# Patient Record
Sex: FEMALE | Race: WHITE | NOT HISPANIC OR LATINO | URBAN - METROPOLITAN AREA
[De-identification: names, ages, dates, MRNs, and addresses within clinical notes are randomized per-mention and may not be internally consistent; named-entity substitution may affect disease eponyms.]

---

## 2022-04-13 ENCOUNTER — LAB OUTSIDE AN ENCOUNTER (OUTPATIENT)
Dept: URBAN - METROPOLITAN AREA CLINIC 19 | Facility: CLINIC | Age: 62
End: 2022-04-13

## 2022-04-13 ENCOUNTER — OUT OF OFFICE VISIT (OUTPATIENT)
Dept: URBAN - METROPOLITAN AREA MEDICAL CENTER 25 | Facility: MEDICAL CENTER | Age: 62
End: 2022-04-13
Payer: COMMERCIAL

## 2022-04-13 DIAGNOSIS — K74.60 ADVANCED CIRRHOSIS: ICD-10-CM

## 2022-04-13 DIAGNOSIS — K52.9 CHRONIC DIARRHEA: ICD-10-CM

## 2022-04-13 DIAGNOSIS — R93.3 ABN FINDINGS-GI TRACT: ICD-10-CM

## 2022-04-13 DIAGNOSIS — R74.8 ABNORMAL ALKALINE PHOSPHATASE TEST: ICD-10-CM

## 2022-04-13 PROCEDURE — 99233 SBSQ HOSP IP/OBS HIGH 50: CPT | Performed by: INTERNAL MEDICINE

## 2022-04-13 PROCEDURE — 99223 1ST HOSP IP/OBS HIGH 75: CPT | Performed by: INTERNAL MEDICINE

## 2022-04-13 PROCEDURE — G8427 DOCREV CUR MEDS BY ELIG CLIN: HCPCS | Performed by: INTERNAL MEDICINE

## 2022-04-16 ENCOUNTER — OUT OF OFFICE VISIT (OUTPATIENT)
Dept: URBAN - METROPOLITAN AREA MEDICAL CENTER 25 | Facility: MEDICAL CENTER | Age: 62
End: 2022-04-16
Payer: COMMERCIAL

## 2022-04-16 ENCOUNTER — LAB OUTSIDE AN ENCOUNTER (OUTPATIENT)
Dept: URBAN - METROPOLITAN AREA CLINIC 19 | Facility: CLINIC | Age: 62
End: 2022-04-16

## 2022-04-16 DIAGNOSIS — K83.1 AMPULLA OF VATER OBSTRUCTION SYNDROME: ICD-10-CM

## 2022-04-16 DIAGNOSIS — R74.8 ABNORMAL ALKALINE PHOSPHATASE TEST: ICD-10-CM

## 2022-04-16 DIAGNOSIS — K83.8 ACQUIRED DILATION OF BILE DUCT: ICD-10-CM

## 2022-04-16 DIAGNOSIS — R93.2 ABN FIND-BILIARY TRACT: ICD-10-CM

## 2022-04-16 PROCEDURE — 43274 ERCP DUCT STENT PLACEMENT: CPT | Performed by: INTERNAL MEDICINE

## 2022-05-02 ENCOUNTER — TELEPHONE ENCOUNTER (OUTPATIENT)
Dept: URBAN - METROPOLITAN AREA CLINIC 19 | Facility: CLINIC | Age: 62
End: 2022-05-02

## 2022-05-20 ENCOUNTER — OFFICE VISIT (OUTPATIENT)
Dept: URBAN - METROPOLITAN AREA CLINIC 128 | Facility: CLINIC | Age: 62
End: 2022-05-20

## 2022-06-04 ENCOUNTER — OUT OF OFFICE VISIT (OUTPATIENT)
Dept: URBAN - METROPOLITAN AREA MEDICAL CENTER 25 | Facility: MEDICAL CENTER | Age: 62
End: 2022-06-04
Payer: COMMERCIAL

## 2022-06-04 DIAGNOSIS — K70.31 ALCOHOLIC CIRRHOSIS OF LIVER WITH ASCITES: ICD-10-CM

## 2022-06-04 DIAGNOSIS — K86.1 CHRONIC PANCREATITIS: ICD-10-CM

## 2022-06-04 DIAGNOSIS — E80.6 HYPERBILIRUBINEMIA: ICD-10-CM

## 2022-06-04 PROCEDURE — G8427 DOCREV CUR MEDS BY ELIG CLIN: HCPCS | Performed by: STUDENT IN AN ORGANIZED HEALTH CARE EDUCATION/TRAINING PROGRAM

## 2022-06-04 PROCEDURE — 99222 1ST HOSP IP/OBS MODERATE 55: CPT | Performed by: STUDENT IN AN ORGANIZED HEALTH CARE EDUCATION/TRAINING PROGRAM

## 2022-06-04 PROCEDURE — 99232 SBSQ HOSP IP/OBS MODERATE 35: CPT | Performed by: STUDENT IN AN ORGANIZED HEALTH CARE EDUCATION/TRAINING PROGRAM

## 2022-06-06 ENCOUNTER — OUT OF OFFICE VISIT (OUTPATIENT)
Dept: URBAN - METROPOLITAN AREA MEDICAL CENTER 25 | Facility: MEDICAL CENTER | Age: 62
End: 2022-06-06
Payer: COMMERCIAL

## 2022-06-06 DIAGNOSIS — R19.7 ACUTE DIARRHEA: ICD-10-CM

## 2022-06-06 DIAGNOSIS — K86.1 ACUTE ON CHRONIC PANCREATITIS: ICD-10-CM

## 2022-06-06 DIAGNOSIS — K70.31 ALCOHOLIC CIRRHOSIS OF LIVER WITH ASCITES: ICD-10-CM

## 2022-06-06 PROCEDURE — 99233 SBSQ HOSP IP/OBS HIGH 50: CPT | Performed by: INTERNAL MEDICINE

## 2022-06-06 PROCEDURE — 99232 SBSQ HOSP IP/OBS MODERATE 35: CPT | Performed by: INTERNAL MEDICINE

## 2022-06-09 ENCOUNTER — OUT OF OFFICE VISIT (OUTPATIENT)
Dept: URBAN - METROPOLITAN AREA MEDICAL CENTER 25 | Facility: MEDICAL CENTER | Age: 62
End: 2022-06-09
Payer: COMMERCIAL

## 2022-06-09 DIAGNOSIS — K70.31 ALCOHOLIC CIRRHOSIS OF LIVER WITH ASCITES: ICD-10-CM

## 2022-06-09 DIAGNOSIS — K86.1 ACUTE ON CHRONIC PANCREATITIS: ICD-10-CM

## 2022-06-09 PROCEDURE — 99233 SBSQ HOSP IP/OBS HIGH 50: CPT | Performed by: INTERNAL MEDICINE

## 2022-06-13 ENCOUNTER — OUT OF OFFICE VISIT (OUTPATIENT)
Dept: URBAN - METROPOLITAN AREA MEDICAL CENTER 33 | Facility: MEDICAL CENTER | Age: 62
End: 2022-06-13
Payer: COMMERCIAL

## 2022-06-13 DIAGNOSIS — R19.7 ACUTE DIARRHEA: ICD-10-CM

## 2022-06-13 DIAGNOSIS — K83.1 AMPULLA OF VATER OBSTRUCTION SYNDROME: ICD-10-CM

## 2022-06-13 DIAGNOSIS — K86.1 ACUTE ON CHRONIC PANCREATITIS: ICD-10-CM

## 2022-06-13 DIAGNOSIS — K86.81 EXOCRINE PANCREATIC INSUFFICIENCY: ICD-10-CM

## 2022-06-13 DIAGNOSIS — K81.0 ACALCULOUS CHOLECYSTITIS: ICD-10-CM

## 2022-06-13 DIAGNOSIS — K74.69 CIRRHOSIS, CRYPTOGENIC: ICD-10-CM

## 2022-06-13 PROCEDURE — 99223 1ST HOSP IP/OBS HIGH 75: CPT | Performed by: INTERNAL MEDICINE

## 2022-06-13 PROCEDURE — G8427 DOCREV CUR MEDS BY ELIG CLIN: HCPCS | Performed by: INTERNAL MEDICINE

## 2022-06-13 PROCEDURE — 99232 SBSQ HOSP IP/OBS MODERATE 35: CPT | Performed by: INTERNAL MEDICINE

## 2022-06-16 ENCOUNTER — OUT OF OFFICE VISIT (OUTPATIENT)
Dept: URBAN - METROPOLITAN AREA MEDICAL CENTER 33 | Facility: MEDICAL CENTER | Age: 62
End: 2022-06-16
Payer: COMMERCIAL

## 2022-06-16 DIAGNOSIS — Z46.59 ENCOUNTER FOR FITTING AND ADJUSTMENT OF OTHER GASTROINTESTINAL APPLIANCE AND DEVICE: ICD-10-CM

## 2022-06-16 DIAGNOSIS — B37.89 CANDIDA RASH OF GROIN: ICD-10-CM

## 2022-06-16 DIAGNOSIS — K29.60 ADENOPAPILLOMATOSIS GASTRICA: ICD-10-CM

## 2022-06-16 DIAGNOSIS — B37.81 CANDIDA: ICD-10-CM

## 2022-06-16 DIAGNOSIS — K83.8 ACQUIRED DILATION OF BILE DUCT: ICD-10-CM

## 2022-06-16 PROCEDURE — 43239 EGD BIOPSY SINGLE/MULTIPLE: CPT | Performed by: INTERNAL MEDICINE

## 2022-06-16 PROCEDURE — 43264 ERCP REMOVE DUCT CALCULI: CPT | Performed by: INTERNAL MEDICINE

## 2022-06-16 PROCEDURE — 43275 ERCP REMOVE FORGN BODY DUCT: CPT | Performed by: INTERNAL MEDICINE

## 2022-06-16 PROCEDURE — 43262 ENDO CHOLANGIOPANCREATOGRAPH: CPT | Performed by: INTERNAL MEDICINE

## 2022-06-16 PROCEDURE — 74328 X-RAY BILE DUCT ENDOSCOPY: CPT | Performed by: INTERNAL MEDICINE

## 2022-07-06 PROBLEM — 235494005 CHRONIC PANCREATITIS: Status: ACTIVE | Noted: 2022-07-06

## 2022-07-06 PROBLEM — 14783006 HYPERBILIRUBINEMIA: Status: ACTIVE | Noted: 2022-07-06

## 2022-07-13 ENCOUNTER — TELEPHONE ENCOUNTER (OUTPATIENT)
Dept: URBAN - METROPOLITAN AREA CLINIC 128 | Facility: CLINIC | Age: 62
End: 2022-07-13

## 2022-07-15 ENCOUNTER — OUT OF OFFICE VISIT (OUTPATIENT)
Dept: URBAN - METROPOLITAN AREA MEDICAL CENTER 25 | Facility: MEDICAL CENTER | Age: 62
End: 2022-07-15
Payer: COMMERCIAL

## 2022-07-15 DIAGNOSIS — K65.2 SBP (SPONTANEOUS BACTERIAL PERITONITIS): ICD-10-CM

## 2022-07-15 DIAGNOSIS — K70.31 ALCOHOLIC CIRRHOSIS OF LIVER WITH ASCITES: ICD-10-CM

## 2022-07-15 PROCEDURE — 99222 1ST HOSP IP/OBS MODERATE 55: CPT | Performed by: INTERNAL MEDICINE

## 2022-07-15 PROCEDURE — G8427 DOCREV CUR MEDS BY ELIG CLIN: HCPCS | Performed by: INTERNAL MEDICINE

## 2022-07-16 ENCOUNTER — LAB OUTSIDE AN ENCOUNTER (OUTPATIENT)
Dept: URBAN - METROPOLITAN AREA CLINIC 19 | Facility: CLINIC | Age: 62
End: 2022-07-16

## 2022-07-16 ENCOUNTER — OUT OF OFFICE VISIT (OUTPATIENT)
Dept: URBAN - METROPOLITAN AREA MEDICAL CENTER 25 | Facility: MEDICAL CENTER | Age: 62
End: 2022-07-16
Payer: COMMERCIAL

## 2022-07-16 DIAGNOSIS — K65.2 SBP (SPONTANEOUS BACTERIAL PERITONITIS): ICD-10-CM

## 2022-07-16 DIAGNOSIS — K70.30 ALC (ALCOHOLIC LIVER CIRRHOSIS): ICD-10-CM

## 2022-07-16 PROCEDURE — 99232 SBSQ HOSP IP/OBS MODERATE 35: CPT | Performed by: INTERNAL MEDICINE

## 2022-07-18 ENCOUNTER — OUT OF OFFICE VISIT (OUTPATIENT)
Dept: URBAN - METROPOLITAN AREA MEDICAL CENTER 25 | Facility: MEDICAL CENTER | Age: 62
End: 2022-07-18
Payer: COMMERCIAL

## 2022-07-18 DIAGNOSIS — K70.31 ALCOHOLIC CIRRHOSIS OF LIVER WITH ASCITES: ICD-10-CM

## 2022-07-18 DIAGNOSIS — R93.3 ABN FINDINGS-GI TRACT: ICD-10-CM

## 2022-07-18 DIAGNOSIS — K65.2 SBP (SPONTANEOUS BACTERIAL PERITONITIS): ICD-10-CM

## 2022-07-18 DIAGNOSIS — R19.7 ACUTE DIARRHEA: ICD-10-CM

## 2022-07-18 PROCEDURE — 99232 SBSQ HOSP IP/OBS MODERATE 35: CPT | Performed by: STUDENT IN AN ORGANIZED HEALTH CARE EDUCATION/TRAINING PROGRAM

## 2022-07-20 ENCOUNTER — LAB OUTSIDE AN ENCOUNTER (OUTPATIENT)
Dept: URBAN - METROPOLITAN AREA CLINIC 19 | Facility: CLINIC | Age: 62
End: 2022-07-20

## 2022-07-21 ENCOUNTER — OUT OF OFFICE VISIT (OUTPATIENT)
Dept: URBAN - METROPOLITAN AREA MEDICAL CENTER 25 | Facility: MEDICAL CENTER | Age: 62
End: 2022-07-21
Payer: COMMERCIAL

## 2022-07-21 DIAGNOSIS — R93.3 ABN FINDINGS-GI TRACT: ICD-10-CM

## 2022-07-21 DIAGNOSIS — K65.2 SBP (SPONTANEOUS BACTERIAL PERITONITIS): ICD-10-CM

## 2022-07-21 DIAGNOSIS — R19.7 ACUTE DIARRHEA: ICD-10-CM

## 2022-07-21 DIAGNOSIS — K70.31 ALCOHOLIC CIRRHOSIS OF LIVER WITH ASCITES: ICD-10-CM

## 2022-07-21 DIAGNOSIS — A04.72 C. DIFFICILE: ICD-10-CM

## 2022-07-21 PROCEDURE — 99232 SBSQ HOSP IP/OBS MODERATE 35: CPT | Performed by: STUDENT IN AN ORGANIZED HEALTH CARE EDUCATION/TRAINING PROGRAM

## 2022-07-21 PROCEDURE — 99231 SBSQ HOSP IP/OBS SF/LOW 25: CPT | Performed by: STUDENT IN AN ORGANIZED HEALTH CARE EDUCATION/TRAINING PROGRAM

## 2022-08-15 ENCOUNTER — OFFICE VISIT (OUTPATIENT)
Dept: URBAN - METROPOLITAN AREA CLINIC 19 | Facility: CLINIC | Age: 62
End: 2022-08-15

## 2022-08-15 RX ORDER — HUMAN INSULIN 100 [IU]/ML
AS DIRECTED INJECTION, SOLUTION SUBCUTANEOUS
Status: ACTIVE | COMMUNITY

## 2022-08-15 RX ORDER — LISINOPRIL 5 MG/1
1 TABLET TABLET ORAL ONCE A DAY
Status: ACTIVE | COMMUNITY

## 2022-08-15 RX ORDER — GABAPENTIN 600 MG/1
1 TABLET TABLET, FILM COATED ORAL ONCE A DAY
Status: ACTIVE | COMMUNITY

## 2022-08-15 RX ORDER — NITROFURANTOIN MONOHYDRATE/MACROCRYSTALLINE 25; 75 MG/1; MG/1
1 CAPSULE AT BEDTIME WITH FOOD CAPSULE ORAL ONCE A DAY
Status: ACTIVE | COMMUNITY

## 2022-08-15 RX ORDER — HUMAN INSULIN 100 [IU]/ML
AS DIRECTED INJECTION, SUSPENSION SUBCUTANEOUS
Status: ACTIVE | COMMUNITY

## 2023-04-03 ENCOUNTER — TELEPHONE ENCOUNTER (OUTPATIENT)
Dept: URBAN - METROPOLITAN AREA CLINIC 19 | Facility: CLINIC | Age: 63
End: 2023-04-03

## 2023-04-12 ENCOUNTER — OFFICE VISIT (OUTPATIENT)
Dept: URBAN - METROPOLITAN AREA CLINIC 19 | Facility: CLINIC | Age: 63
End: 2023-04-12

## 2023-04-25 ENCOUNTER — OFFICE VISIT (OUTPATIENT)
Dept: URBAN - METROPOLITAN AREA CLINIC 19 | Facility: CLINIC | Age: 63
End: 2023-04-25

## 2023-04-25 RX ORDER — RIFAXIMIN 550 MG/1
TAKE 1 TABLET (550 MG) BY ORAL ROUTE 3 TIMES PER DAY FOR 14 DAYS TABLET ORAL
Qty: 42 | Refills: 0 | Status: ACTIVE | COMMUNITY
Start: 2017-12-01

## 2023-04-25 RX ORDER — CIPROFLOXACIN HYDROCHLORIDE 500 MG/1
TAKE 1 TABLET (500 MG) BY ORAL ROUTE 2 TIMES PER DAY FOR 14 DAYS TABLET, FILM COATED ORAL 2
Qty: 28 | Refills: 0 | Status: ACTIVE | COMMUNITY
Start: 2017-12-01

## 2023-04-25 RX ORDER — GABAPENTIN 600 MG/1
1 TABLET TABLET, FILM COATED ORAL ONCE A DAY
Status: ACTIVE | COMMUNITY

## 2023-04-25 RX ORDER — LISINOPRIL 5 MG/1
1 TABLET TABLET ORAL ONCE A DAY
Status: ACTIVE | COMMUNITY

## 2023-04-25 RX ORDER — HUMAN INSULIN 100 [IU]/ML
AS DIRECTED INJECTION, SOLUTION SUBCUTANEOUS
Status: ACTIVE | COMMUNITY

## 2023-04-25 RX ORDER — HUMAN INSULIN 100 [IU]/ML
AS DIRECTED INJECTION, SUSPENSION SUBCUTANEOUS
Status: ACTIVE | COMMUNITY

## 2023-04-25 RX ORDER — NITROFURANTOIN MONOHYDRATE/MACROCRYSTALLINE 25; 75 MG/1; MG/1
1 CAPSULE AT BEDTIME WITH FOOD CAPSULE ORAL ONCE A DAY
Status: ACTIVE | COMMUNITY

## 2023-05-30 ENCOUNTER — CLAIMS CREATED FROM THE CLAIM WINDOW (OUTPATIENT)
Dept: URBAN - METROPOLITAN AREA CLINIC 128 | Facility: CLINIC | Age: 63
End: 2023-05-30
Payer: COMMERCIAL

## 2023-05-30 VITALS
BODY MASS INDEX: 18.16 KG/M2 | SYSTOLIC BLOOD PRESSURE: 108 MMHG | DIASTOLIC BLOOD PRESSURE: 72 MMHG | WEIGHT: 113 LBS | HEIGHT: 66 IN | HEART RATE: 68 BPM | TEMPERATURE: 97.8 F

## 2023-05-30 DIAGNOSIS — R19.7 DIARRHEA: ICD-10-CM

## 2023-05-30 DIAGNOSIS — Z86.010 PERSONAL HISTORY OF COLONIC POLYPS: ICD-10-CM

## 2023-05-30 DIAGNOSIS — K74.60 ADVANCED CIRRHOSIS: ICD-10-CM

## 2023-05-30 DIAGNOSIS — K76.0 FATTY LIVER: ICD-10-CM

## 2023-05-30 DIAGNOSIS — K86.81 EXOCRINE PANCREATIC INSUFFICIENCY: ICD-10-CM

## 2023-05-30 DIAGNOSIS — K74.60 CIRRHOSIS OF LIVER WITHOUT ASCITES, UNSPECIFIED HEPATIC CIRRHOSIS TYPE: ICD-10-CM

## 2023-05-30 DIAGNOSIS — K80.20 CALCULUS OF GALLBLADDER WITHOUT CHOLECYSTITIS WITHOUT OBSTRUCTION: ICD-10-CM

## 2023-05-30 PROBLEM — 197321007: Status: ACTIVE | Noted: 2023-05-30

## 2023-05-30 PROBLEM — 428283002: Status: ACTIVE | Noted: 2023-05-30

## 2023-05-30 PROBLEM — 19943007: Status: ACTIVE | Noted: 2023-05-30

## 2023-05-30 PROBLEM — 70342003: Status: ACTIVE | Noted: 2023-05-30

## 2023-05-30 PROCEDURE — 99205 OFFICE O/P NEW HI 60 MIN: CPT | Performed by: PHYSICIAN ASSISTANT

## 2023-05-30 RX ORDER — PANCRELIPASE 36000; 180000; 114000 [USP'U]/1; [USP'U]/1; [USP'U]/1
TAKE 1 CAPSULE BEFORE EACH MEAL AND EACH SNACK CAPSULE, DELAYED RELEASE PELLETS ORAL
Qty: 150 | Refills: 2 | OUTPATIENT
Start: 2023-05-30 | End: 2023-08-28

## 2023-05-30 RX ORDER — HUMAN INSULIN 100 [IU]/ML
AS DIRECTED INJECTION, SOLUTION SUBCUTANEOUS
Status: ACTIVE | COMMUNITY

## 2023-05-30 RX ORDER — HUMAN INSULIN 100 [IU]/ML
AS DIRECTED INJECTION, SUSPENSION SUBCUTANEOUS
Status: ACTIVE | COMMUNITY

## 2023-05-30 RX ORDER — CHOLESTYRAMINE 4 G/9G
1 PACKET MIXED WITH WATER OR NON-CARBONATED DRINK POWDER, FOR SUSPENSION ORAL TWICE A DAY
Qty: 60 | Refills: 2 | OUTPATIENT
Start: 2023-05-30

## 2023-05-30 NOTE — HPI-TODAY'S VISIT:
The patient elicits having diarrhea. Patient has had how many bowel movements a day: 4-5 BMs a day She needs the patient assistance paperwork filled out for her creon to be refilled She has fecal incontinence due to a "botched rectal surgery" per patient Duration of symptoms: years It is relieved by: creon It is aggravated by: nothing Associated symptoms:  none Recent antibiotics: none Recent travel outside of [place]: none Recent sick contacts: none Current stress: yes Recent medication changes: none Recent dietary changes: none Recent weight changes: none 06/22 EGD revealed candida esophagitis, ERCP with stent removal, sweep of sludge, and sphincterotomy was performed. She never went to the general surgeon to see if she needed a cholecystectomy Last colonoscopy date: 2016 revealed a tubular adenoma and she was advised to repeat it in 3 years She had a hip fracture last year after falling going to the bathroom at night while on gabapentin  2022 MRCP/MRI revealed  Distention of the gallbladder and marked, bile duct         dilation, at least mildly progressed compared to 2014 and 2016. There         is abrupt transition to decompressed distal common bile duct at the         level of the pancreatic head and again seen are changes of chronic         pancreatitis, as well as persistent pancreatic ductal dilation. Within         limitations imposed by respiratory motion and lack of intravenous         contrast, no discrete pancreatic head mass is identified. There is         layering low-signal intensity within the common bile duct which could         represent sludge and/or small stones, versus artifact. ERCP could be         considered for further evaluation. The caliber change of the distal         common bile duct is favored to represent chronic stricturing in the         setting of multiple episodes of prior pancreatitis but is         indeterminate. Short-term follow-up may be considered, versus further         evaluation with postcontrast MRI of the pancreas to better evaluate the         region of the pancreatic head.          Cirrhotic morphology of the liver with a small amount of adjacent         ascites. Again, within limitations imposed by lack of intravenous         contrast, no definite focal hepatic lesions are seen on this exam.         Kidneys where seen are also unremarkable.          Suspected significant central canal stenosis at L2-L3, incompletely         evaluated but likely related to significant disc/osteophyte complex at         that level.  RUQ US 2022  revealed remarkably abnormal gallbladder with gallbladder wall thickening and         findings suspicious for intramural edema. Gallbladder sludge and         cholelithiasis is noted. The common bile duct is dilated measured at 8         mm and 16 mm. Sonographic Monahan's sign was reportedly not present.         Common bile duct obstruction is not excluded. Common bile duct stent         reported on the prior ultrasound and noted on recent prior CT, not         demonstrated on the current study ultrasound. Clinical correlation is         needed.          Mild hepatic steatosis. Portal vein patent with hepatopedal flow.          The pancreas was poorly visualized.          There is a small amount of ascites present.

## 2023-05-31 ENCOUNTER — TELEPHONE ENCOUNTER (OUTPATIENT)
Dept: URBAN - METROPOLITAN AREA CLINIC 128 | Facility: CLINIC | Age: 63
End: 2023-05-31

## 2023-06-05 ENCOUNTER — OFFICE VISIT (OUTPATIENT)
Dept: URBAN - METROPOLITAN AREA SURGERY CENTER 31 | Facility: SURGERY CENTER | Age: 63
End: 2023-06-05

## 2023-06-23 ENCOUNTER — TELEPHONE ENCOUNTER (OUTPATIENT)
Dept: URBAN - METROPOLITAN AREA CLINIC 19 | Facility: CLINIC | Age: 63
End: 2023-06-23

## 2023-06-29 ENCOUNTER — LAB OUTSIDE AN ENCOUNTER (OUTPATIENT)
Dept: URBAN - METROPOLITAN AREA CLINIC 128 | Facility: CLINIC | Age: 63
End: 2023-06-29

## 2023-07-04 LAB
A/G RATIO: 1.6
ABSOLUTE BASOPHILS: 85
ABSOLUTE EOSINOPHILS: 721
ABSOLUTE LYMPHOCYTES: 4378
ABSOLUTE MONOCYTES: 583
ABSOLUTE NEUTROPHILS: 4834
ACTIN (SMOOTH MUSCLE) ANTIBODY (IGG): <20
AFP, SERUM, TUMOR MARKER: 3
ALBUMIN: 4.3
ALKALINE PHOSPHATASE: 320
ALPHA-1-ANTITRYPSIN QN: 154
ALT (SGPT): 21
ANA SCREEN, IFA: POSITIVE
AST (SGOT): 22
BASOPHILS: 0.8
BILIRUBIN, TOTAL: 0.5
BUN/CREATININE RATIO: (no result)
BUN: 12
C-REACTIVE PROTEIN, QUANT: 1.8
CALCIUM: 9.5
CARBON DIOXIDE, TOTAL: 26
CERULOPLASMIN: 28
CHLORIDE: 90
CREATININE: 0.81
EGFR: 82
EOSINOPHILS: 6.8
FERRITIN, SERUM: 51
GLOBULIN, TOTAL: 2.7
GLUCOSE: 152
HBSAG SCREEN: (no result)
HEMATOCRIT: 38.5
HEMOGLOBIN: 13
HEP A AB, IGM: (no result)
HEP B CORE AB, IGM: (no result)
HEPATITIS C ANTIBODY: (no result)
IMMUNOGLOBULIN A, QN, SERUM: 364
INR: 1.1
INTERPRETATION: (no result)
INTERPRETATION: (no result)
IRON BIND.CAP.(TIBC): 312
IRON SATURATION: 23
IRON: 72
LYMPHOCYTES: 41.3
MCH: 31.8
MCHC: 33.8
MCV: 94.1
MITOCHONDRIAL (M2) ANTIBODY: 21.7
MONOCYTES: 5.5
MPV: 10.1
NEUTROPHILS: 45.6
PLATELET COUNT: 287
POTASSIUM: 4.7
PROTEIN, TOTAL: 7
PT: 11.2
RDW: 12.2
RED BLOOD CELL COUNT: 4.09
RHEUMATOID FACTOR: <14
SJOGREN'S ANTIBODY (SS-A): (no result)
SJOGREN'S ANTIBODY (SS-B): (no result)
SODIUM: 129
T-TRANSGLUTAMINASE (TTG) IGA: <1
TSH W/REFLEX TO FT4: 1.6
WHITE BLOOD CELL COUNT: 10.6

## 2023-07-08 LAB
CALPROTECTIN, FECAL: 36
CAMPYLOBACTER SPP. AG,EIA: (no result)
CLOSTRIDIUM DIFFICILE TOXINB,QL REAL TIME PCR: NOT DETECTED
GIARDIA AG, EIA, STOOL: (no result)
LEUKOCYTES: (no result)
OVA AND PARASITES, CONC AND PERM SMEAR: (no result)
PANCREATIC ELASTASE, FECAL: <15
SALMONELLA AND SHIGELLA, CULTURE: (no result)
SHIGA TOXINS, EIA W/RFL TO E.COLI O157 CULTURE: (no result)

## 2023-07-19 ENCOUNTER — CLAIMS CREATED FROM THE CLAIM WINDOW (OUTPATIENT)
Dept: URBAN - METROPOLITAN AREA CLINIC 128 | Facility: CLINIC | Age: 63
End: 2023-07-19
Payer: COMMERCIAL

## 2023-07-19 ENCOUNTER — TELEPHONE ENCOUNTER (OUTPATIENT)
Dept: URBAN - METROPOLITAN AREA CLINIC 128 | Facility: CLINIC | Age: 63
End: 2023-07-19

## 2023-07-19 ENCOUNTER — WEB ENCOUNTER (OUTPATIENT)
Dept: URBAN - METROPOLITAN AREA CLINIC 128 | Facility: CLINIC | Age: 63
End: 2023-07-19

## 2023-07-19 VITALS
HEART RATE: 80 BPM | TEMPERATURE: 98.3 F | SYSTOLIC BLOOD PRESSURE: 118 MMHG | DIASTOLIC BLOOD PRESSURE: 66 MMHG | BODY MASS INDEX: 18.71 KG/M2 | WEIGHT: 116.4 LBS | HEIGHT: 66 IN

## 2023-07-19 DIAGNOSIS — R19.7 DIARRHEA: ICD-10-CM

## 2023-07-19 DIAGNOSIS — K80.20 CALCULUS OF GALLBLADDER WITHOUT CHOLECYSTITIS WITHOUT OBSTRUCTION: ICD-10-CM

## 2023-07-19 DIAGNOSIS — K74.69 CIRRHOSIS, CRYPTOGENIC: ICD-10-CM

## 2023-07-19 DIAGNOSIS — K86.81 EXOCRINE PANCREATIC INSUFFICIENCY: ICD-10-CM

## 2023-07-19 DIAGNOSIS — Z86.010 PERSONAL HISTORY OF COLONIC POLYPS: ICD-10-CM

## 2023-07-19 PROCEDURE — 99214 OFFICE O/P EST MOD 30 MIN: CPT | Performed by: PHYSICIAN ASSISTANT

## 2023-07-19 RX ORDER — CHOLESTYRAMINE 4 G/9G
1 PACKET MIXED WITH WATER OR NON-CARBONATED DRINK POWDER, FOR SUSPENSION ORAL TWICE A DAY
Qty: 60 | Refills: 2 | OUTPATIENT

## 2023-07-19 RX ORDER — PANCRELIPASE 36000; 180000; 114000 [USP'U]/1; [USP'U]/1; [USP'U]/1
TAKE 1 CAPSULE BEFORE EACH MEAL AND EACH SNACK CAPSULE, DELAYED RELEASE PELLETS ORAL
Qty: 150 | Refills: 2 | Status: ACTIVE | COMMUNITY
Start: 2023-05-30 | End: 2023-08-28

## 2023-07-19 RX ORDER — CHOLESTYRAMINE 4 G/9G
1 PACKET MIXED WITH WATER OR NON-CARBONATED DRINK POWDER, FOR SUSPENSION ORAL TWICE A DAY
Qty: 60 | Refills: 2 | Status: ACTIVE | COMMUNITY
Start: 2023-05-30

## 2023-07-19 RX ORDER — PANCRELIPASE 36000; 180000; 114000 [USP'U]/1; [USP'U]/1; [USP'U]/1
TAKE 1 CAPSULE BEFORE EACH MEAL AND EACH SNACK CAPSULE, DELAYED RELEASE PELLETS ORAL
Qty: 150 | Refills: 2 | OUTPATIENT

## 2023-07-19 NOTE — PHYSICAL EXAM GASTROINTESTINAL
Abdomen , soft, nontender, nondistended , no guarding or rigidity , no masses palpable , normal bowel sounds, negative Monahan's sign, negative psoas and obturators signs, negative CVA tenderness bilaterally Liver and Spleen , no hepatosplenomegaly Rectal deferred

## 2023-07-19 NOTE — HPI-TODAY'S VISIT:
The patient is here for a follow up visit on her diarrhea. It has ceased. She is on creon now and is doing well and having 1 BM every other day Duration of symptoms: years It is relieved by: creon It is aggravated by: nothing Associated symptoms:  none Recent antibiotics: none Recent travel outside of [place]: none Recent sick contacts: none Current stress: yes Recent medication changes: none Recent dietary changes: none Recent weight changes: none 06/22 EGD revealed candida esophagitis, ERCP with stent removal, sweep of sludge, and sphincterotomy was performed. She never went to the general surgeon to see if she needed a cholecystectomy Last colonoscopy date: 2016 revealed a tubular adenoma and she was advised to repeat it in 3 years She had a hip fracture last year after falling going to the bathroom at night while on gabapentin  2022 MRCP/MRI revealed  Distention of the gallbladder and marked, bile duct         dilation, at least mildly progressed compared to 2014 and 2016. There         is abrupt transition to decompressed distal common bile duct at the         level of the pancreatic head and again seen are changes of chronic         pancreatitis, as well as persistent pancreatic ductal dilation. Within         limitations imposed by respiratory motion and lack of intravenous         contrast, no discrete pancreatic head mass is identified. There is         layering low-signal intensity within the common bile duct which could         represent sludge and/or small stones, versus artifact. ERCP could be         considered for further evaluation. The caliber change of the distal         common bile duct is favored to represent chronic stricturing in the         setting of multiple episodes of prior pancreatitis but is         indeterminate. Short-term follow-up may be considered, versus further         evaluation with postcontrast MRI of the pancreas to better evaluate the         region of the pancreatic head.          Cirrhotic morphology of the liver with a small amount of adjacent         ascites. Again, within limitations imposed by lack of intravenous         contrast, no definite focal hepatic lesions are seen on this exam.         Kidneys where seen are also unremarkable.          Suspected significant central canal stenosis at L2-L3, incompletely         evaluated but likely related to significant disc/osteophyte complex at         that level.  RUQ US 2022  revealed remarkably abnormal gallbladder with gallbladder wall thickening and         findings suspicious for intramural edema. Gallbladder sludge and         cholelithiasis is noted. The common bile duct is dilated measured at 8         mm and 16 mm. Sonographic Monahan's sign was reportedly not present.         Common bile duct obstruction is not excluded. Common bile duct stent         reported on the prior ultrasound and noted on recent prior CT, not         demonstrated on the current study ultrasound. Clinical correlation is         needed.          Mild hepatic steatosis. Portal vein patent with hepatopedal flow.          The pancreas was poorly visualized.          There is a small amount of ascites present.  Her SAURAV was positive, her AMA was elevated. Her alk phos revealed 320. Her sodium was 129 so she was advised to follow up with pcp about this. She has not done the repeat MRI/MRCP or the colonoscopy yet due to a fall she had recently but will reschedule these today.

## 2023-08-04 ENCOUNTER — LAB OUTSIDE AN ENCOUNTER (OUTPATIENT)
Dept: URBAN - METROPOLITAN AREA CLINIC 128 | Facility: CLINIC | Age: 63
End: 2023-08-04

## 2023-08-11 ENCOUNTER — TELEPHONE ENCOUNTER (OUTPATIENT)
Dept: URBAN - METROPOLITAN AREA CLINIC 128 | Facility: CLINIC | Age: 63
End: 2023-08-11

## 2023-08-14 ENCOUNTER — DASHBOARD ENCOUNTERS (OUTPATIENT)
Age: 63
End: 2023-08-14

## 2023-08-14 ENCOUNTER — TELEPHONE ENCOUNTER (OUTPATIENT)
Dept: URBAN - METROPOLITAN AREA CLINIC 86 | Facility: CLINIC | Age: 63
End: 2023-08-14

## 2023-08-14 PROBLEM — 38341003: Status: ACTIVE | Noted: 2023-08-14

## 2023-08-14 PROBLEM — 386033004: Status: ACTIVE | Noted: 2023-08-14

## 2023-08-14 NOTE — HPI-TODAY'S VISIT:
Patient is a 63-year-old female who we are being asked to see at the request of Nessa Lovelace PA-C for liver issues. A copy of the note will be sent to the referring provider. Patient was last seen by me in the whole on July 19, 2023 and at that time was listed as having a history of diarrhea for which she had been on Creon therapy and was doing better and her diarrhea symptoms had improved.  She was not having 1 bowel movement every other day. Patient back on June 22 had done an EGD that revealed Candida esophagitis.  She had an ERCP with stent removal and sweep of sludge and sphincterotomy that was performed. She had not seen a general surgeon regarding need for cholecystectomy. Last colonoscopy had been in 2016 with a tubular adenoma noted and she was advised to repeat it in 3 years. She had a reported history of a hip fracture noted in 2022 after falling going to the bathroom at night while on gabapentin. In 2022 she had an MRI that revealed distention of the gallbladder and marked bile duct dilation that had mildly progressed versus 2014 2016 there was abrupt transition to decompressed distal common bile duct at the level of pancreatic head and again changes of chronic pancreatitis were seen as well as persistent pancreatic duct dilation.  There is no discrete pancreatic head mass identified although did mention some respiratory motion issues and lack of contrast.  There was some layering of low signal intensity in the common bile duct which could represent sludge and/or small stones.  The recommended ERCP.  They thought that the caliber change suggested that the patient could have chronic stricturing in the setting of multiple sites of prior pancreatitis but was not.  They recommended a postcontrast MRI also to assess the pancreatic head.  They mention the liver appeared to be cirrhotic with a small amount of adjacent ascites.  No definite liver lesion seen but again no contrast noted.  Kidneys were unremarkable.  We also suspected significant central canal stenosis at lumbar 2/3 that could be related to significant disc/osteophyte complex. Right upper quadrant ultrasound in 2022 that showed abnormal gallbladder with gallbladder wall thickening and findings suspicious for intramural edema.  Gallbladder sludge and cholelithiasis was seen and common bile duct was 8 mm and 16 mm.  Common bile duct obstruction was not excluded and dimension the common bile duct stent reported per ultrasound and noted.  CT was not seen.  Mild liver fat was seen. She was listed as being SAURAV positive, AMA positive and alk phos at 320.  Sodium was 129. BMI was noted to be 18.79 with a weight of 116.4. June 29, 2023 labs showed iron sat of 23%, ceruloplasmin 28, alpha-1 154, AFP 3.0, ferritin 51, C-reactive protein 1.8, ASMA less than 20, AMA only slightly positive at 21.7, SAURAV was positive with SSA and SSB negative and rheumatoid factor negative.  Celiac panel screen showed IgA TTG was negative at less than 1 and IgA was elevated at 364 with normal being from 70 up to 320.  TSH was 1.6.  Glucose was 152, BUN of 12 creatinine 0.81 sodium 129 potassium 4.7 chloride 90 CO2 of 26 calcium 9.5 albumin 4.3 bili 0.5 alk phos was elevated at 320 with an AST of 22 and ALT of 21.  WBC was 10.6 hemoglobin 13 plate count 287 with an MCV of 94.1 INR 1.1.  Hep A IgM was negative, hep B surface antigen negative, hep B core IgM negative, hep C antibody negative.  C. difficile was negative. We were able to see the EGD on June 16, 2022 done by Dr. Barksdale that showed that a biliary stent was seen on the  film and suspected Candida esophagus was seen in the esophagus was biopsied she had ischemic gastropathy area that was seen there was black/necrotic and the ERCP with stent removal, stricturotomy, and sweep of sludge was performed.  Cholangiogram revealed normal cystic duct and filling of the gallbladder and the patient was left stent free at that point.  Pending her labs they are considering a repeat stenting and he planned an MRI MRCP in a month and to see him in the office 2 weeks and to be evaluated for cholecystectomy and to get a PPI. The April 16, 2022 ERCP showed prior biliary sphincterotomy and entire bile duct was markedly dilated secondary to a stricture cells for cytology were obtained and 1 temporary plastic biliary stent was placed, bile duct.  They plan to perform an ERCP appointment afterwards. Dr Edmond Lo.   June 16, 2022 pathology report showed esophagus biopsy Candida esophagitis stomach with necroinflammatory debris with adjacent admixed fungal yeast morphology compatible with Candida gastric ulcer was not present. Please consult dated July 15, 2022 by Dr. Ramirez Chiang the mention that the patient had a paracentesis done of ascites at the time that was notable for spontaneous bacterial peritonitis and for which they were treated with Rocephin.  I mention suspicion of alcoholic cirrhosis of the liver with ascites, diabetes, hyponatremia intertrochanteric fracture of the right hip in the SBP has issues the recommended low-dose diuretics with Lasix and Aldactone and I found the fluid ascites had an albumin of 1.0 nucleated cells being 18,852 neutrophils 89% lymphocytes were 8%.  HIDA scan showed delayed filling of the gallbladder with patent cystic duct and normal biliary transit.  Final recommendation they had given was for her to be assessed for surgery for pending for gallbladder surgery.  And he had recommended a follow-up paracentesis. A follow-up note from July 22 mention that the patient had a MELD sodium of only 6 at the time and that she had had a 3.6 L paracentesis done on July 19 with no fluid studies drawn and then had the July 20 At 1.2 L removed with the ANC being 235.  She had a Bacteroides uniformis bacteremia and ID had seen the patient.  She is also getting treatment for C. difficile with Dificid. June 8 2022 MRI showed cirrhotic morphology of the liver with ill-defined area of enhancement seen in segment 4A of the liver measuring 2.4 x 1.4 cm and additional smaller patchy areas or 2 enhancement at the periphery of segment 2 for a seen.  No prior MRI noted.  3 mm cyst seen in segment 4A.  Gallbladder was distended with stones but no definite cholecystitis, bile duct was 2.7 cm and patulous.  Spleen was grossly normal and pancreas with mild atrophy.  Mild atrophy of the pancreas seen with irregular dilation of pancreatic duct as well.  They mention that the patient had her in summary cirrhosis changes with stigmata of hypertension including moderate ascites, upper abdominal varices, and a few areas of arterial hyperenhancement likely arterial shunts and recommended 3-month MRI to reassess.  Significant edematous wall thickening of the stomach was seen recommend EGD and patulous common bile duct changes were seen measuring up to 2.7 cm and choledochal cyst would be difficult to exclude. 1.	Involving the case of a patient with initial alcoholic cirrhosis with ascites that has evolved more into pancreatic insufficiency with improvement of labs with cessation of same.  Patient also had additional complicated calculus of the gallbladder with what appeared to be need for an ERCP and then the subsequent stent removal that was done this year with removal of debris as well.  Patient at this point needs to see what her labs are doing and check her labs and her score.  Previously reported to have several paracentesis but with one in particular with a very high cell count which makes 1 wonder if there was some type of other cause for it such as secondary seeding of the issue is typically just did not make sense with the limited records that I could see.  Patient does not appear to be having the fluid issues now and would repeat additional imaging to consider further follow-up to see how they are doing from there.  Due to distance issues and patient difficulty in travel may need to be seen closer to home since she is in the [facility] system may be best to be seen by the [facility] hepatology portion of the transplant team as they have experience in both seeing her from that and can help use the hepatobiliary expertise if they believe she needs additional surgical procedures to be considered 2.	Dilated bile duct noted with recent removal of ERCP stent and sweep of the duct noted.  Need to see how her labs are doing.  And follow accordingly.  Previously had an ERCP with stent placed in 2022 and this was removed 9/1/2023. 3.	Pancreatic insufficiency on supplements for same and appears to be doing better per her report.   4.	High risk medicine usage noted and monitor on same. 5.	Vaccine counseling should be checked for hep A/B immunity and consider vaccine. 6.	Personal history of colon polyps noted and as per general GI. 7.	Calculus of the gallbladder history and needing to be assessed for possible bladder bili issues but with concerns of cirrhosis and portal pretension that would need to be addressed as well.  May benefit again from being seen in large institution with more hepatobiliary experience. 8.	Fatty liver history reported.  Certainly at risk for same with prior alcohol usage and also the pancreatic insufficiency as well adding to this as well.  Removal from the alcohol and treatment of the pancreas deficiency may help over time. 9.	Hypertension history as per team.  Avoid ACE inhibitor's and ACE receptor blockers in patients with pulm hypertension. 10.	Neuropathy history noted and defer to team. 11.	History of possible ascites with peritonitis noted in the past unclear if primary or secondary as was noted around that timeframe or significant infection other issues were noted.  Need to see if there is any fluid now she is not on any Prophylaxis for same. Plan 1.  We will check quantitate immunoglobulins as I suspected the SAURAV and the AMA are falsely positive given the AST and ALT being normal. 2.  Patients have these low-level positive SAURAV and AMA and up to 40% of the cases related to fatty liver either from pancreatic insufficiency issues or related to the previous alcohol that she had exposure history noted. 3.  Patient needs updated MRI to get better look at liver given the questionable issues raised before as specifically areas were seen HERE hyperenhancement that they were possibly arterial portal shunt that needed to be correlated. 4.  Given the complicated course that she has had in the suspected history of underlying cirrhosis before Fridays, may be worthwhile to have the transplant team at [facility] assess the patient and their hepatology clinic due to the hepatobiliary expertise there and consider options for this patient based upon above.   Duration of the visit was minutes with 10 minutes of chart prep and 20 minutes of face to face/TeleMed visit reviewing recent records, discussing their current status and the future plans for the patient.

## 2023-08-15 ENCOUNTER — OFFICE VISIT (OUTPATIENT)
Dept: URBAN - METROPOLITAN AREA TELEHEALTH 2 | Facility: TELEHEALTH | Age: 63
End: 2023-08-15

## 2023-08-15 ENCOUNTER — TELEPHONE ENCOUNTER (OUTPATIENT)
Dept: URBAN - METROPOLITAN AREA CLINIC 86 | Facility: CLINIC | Age: 63
End: 2023-08-15

## 2023-08-15 VITALS — WEIGHT: 116 LBS | HEIGHT: 66 IN | BODY MASS INDEX: 18.64 KG/M2

## 2023-08-15 RX ORDER — PANCRELIPASE 36000; 180000; 114000 [USP'U]/1; [USP'U]/1; [USP'U]/1
TAKE 1 CAPSULE BEFORE EACH MEAL AND EACH SNACK CAPSULE, DELAYED RELEASE PELLETS ORAL
Qty: 150 | Refills: 2 | Status: ACTIVE | COMMUNITY

## 2023-08-15 RX ORDER — CHOLESTYRAMINE 4 G/9G
1 PACKET MIXED WITH WATER OR NON-CARBONATED DRINK POWDER, FOR SUSPENSION ORAL TWICE A DAY
Qty: 60 | Refills: 2 | Status: ACTIVE | COMMUNITY

## 2023-08-15 NOTE — HPI-TODAY'S VISIT:
Patient is a 63-year-old female who we are being asked to see at the request of Nessa Lovelace PA-C for liver issues. A copy of the note will be sent to the referring provider.  August 4, 2023 [facility] MRI states that the patient has cirrhotic morphology of the liver there was seen but no associated splenomegaly and no significant ascites seen on that exam. Previous multifocal areas of arterial hyperenhancement noted in the left lobe on the previous exam were no longer seen no suspicious liver lesions were seen.  Distended gallbladder containing gallstones were seen with subtle mucosal hyperenhancement of the gallbladder seen on later postcontrast images and possible mild gallbladder wall thickening.  All these findings could be related to hypoalbuminemia or systemic liver disease, they recommended correlation for acute cholecystitis to be excluded. Common bile duct remains patulous and again a choledochal cyst is not excluded but no definite evidence of choledocholithiasis was seen.  There is about transition in caliber the decompressed distal common bile duct about 1.1 cm above the expected location of the ampulla Vater which may represent a stricture.  No definite focal soft tissue masses seen".  Not previously for an ERCP was recommended. Spleen was 9.1 cm.  Common bile duct was 1.9 cm.  Generalized atrophy of the pancreatic rectum again seen compatible changes of chronic pancreatitis and unchanged prominence of the main pancreatic duct measuring 4 mm.  Previously seen gastric wall thickening has resolved and adrenal and kidneys unremarkable appearance to them.  Patient was last seen by me in the whole on July 19, 2023 and at that time was listed as having a history of diarrhea for which she had been on Creon therapy and was doing better and her diarrhea symptoms had improved.  She was not having 1 bowel movement every other day. Patient back on June 22 had done an EGD that revealed Candida esophagitis.  She had an ERCP with stent removal and sweep of sludge and sphincterotomy that was performed. She had not seen a general surgeon regarding need for cholecystectomy. Last colonoscopy had been in 2016 with a tubular adenoma noted and she was advised to repeat it in 3 years. She had a reported history of a hip fracture noted in 2022 after falling going to the bathroom at night while on gabapentin. In 2022 she had an MRI that revealed distention of the gallbladder and marked bile duct dilation that had mildly progressed versus 2014 2016 there was abrupt transition to decompressed distal common bile duct at the level of pancreatic head and again changes of chronic pancreatitis were seen as well as persistent pancreatic duct dilation.  There is no discrete pancreatic head mass identified although did mention some respiratory motion issues and lack of contrast.  There was some layering of low signal intensity in the common bile duct which could represent sludge and/or small stones.  The recommended ERCP.  They thought that the caliber change suggested that the patient could have chronic stricturing in the setting of multiple sites of prior pancreatitis but was not.  They recommended a postcontrast MRI also to assess the pancreatic head.  They mention the liver appeared to be cirrhotic with a small amount of adjacent ascites.  No definite liver lesion seen but again no contrast noted.  Kidneys were unremarkable.  We also suspected significant central canal stenosis at lumbar 2/3 that could be related to significant disc/osteophyte complex. Right upper quadrant ultrasound in 2022 that showed abnormal gallbladder with gallbladder wall thickening and findings suspicious for intramural edema.  Gallbladder sludge and cholelithiasis was seen and common bile duct was 8 mm and 16 mm.  Common bile duct obstruction was not excluded and dimension the common bile duct stent reported per ultrasound and noted.  CT was not seen.  Mild liver fat was seen. She was listed as being SAURAV positive, AMA positive and alk phos at 320.  Sodium was 129. BMI was noted to be 18.79 with a weight of 116.4. June 29, 2023 labs showed iron sat of 23%, ceruloplasmin 28, alpha-1 154, AFP 3.0, ferritin 51, C-reactive protein 1.8, ASMA less than 20, AMA only slightly positive at 21.7, SAURAV was positive with SSA and SSB negative and rheumatoid factor negative.  Celiac panel screen showed IgA TTG was negative at less than 1 and IgA was elevated at 364 with normal being from 70 up to 320.  TSH was 1.6.  Glucose was 152, BUN of 12 creatinine 0.81 sodium 129 potassium 4.7 chloride 90 CO2 of 26 calcium 9.5 albumin 4.3 bili 0.5 alk phos was elevated at 320 with an AST of 22 and ALT of 21.  WBC was 10.6 hemoglobin 13 plate count 287 with an MCV of 94.1 INR 1.1.  Hep A IgM was negative, hep B surface antigen negative, hep B core IgM negative, hep C antibody negative.  C. difficile was negative. We were able to see the EGD on June 16, 2022 done by Dr. Barksdale that showed that a biliary stent was seen on the  film and suspected Candida esophagus was seen in the esophagus was biopsied she had ischemic gastropathy area that was seen there was black/necrotic and the ERCP with stent removal, stricturotomy, and sweep of sludge was performed.  Cholangiogram revealed normal cystic duct and filling of the gallbladder and the patient was left stent free at that point.  Pending her labs they are considering a repeat stenting and he planned an MRI MRCP in a month and to see him in the office 2 weeks and to be evaluated for cholecystectomy and to get a PPI. The April 16, 2022 ERCP showed prior biliary sphincterotomy and entire bile duct was markedly dilated secondary to a stricture cells for cytology were obtained and 1 temporary plastic biliary stent was placed, bile duct.  They plan to perform an ERCP appointment afterwards. Dr Edmond Lo.   June 16, 2022 pathology report showed esophagus biopsy Candida esophagitis stomach with necroinflammatory debris with adjacent admixed fungal yeast morphology compatible with Candida gastric ulcer was not present. Please consult dated July 15, 2022 by Dr. Ramirez Chiang the mention that the patient had a paracentesis done of ascites at the time that was notable for spontaneous bacterial peritonitis and for which they were treated with Rocephin.  I mention suspicion of alcoholic cirrhosis of the liver with ascites, diabetes, hyponatremia intertrochanteric fracture of the right hip in the SBP has issues the recommended low-dose diuretics with Lasix and Aldactone and I found the fluid ascites had an albumin of 1.0 nucleated cells being 18,852 neutrophils 89% lymphocytes were 8%.  HIDA scan showed delayed filling of the gallbladder with patent cystic duct and normal biliary transit.  Final recommendation they had given was for her to be assessed for surgery for pending for gallbladder surgery.  And he had recommended a follow-up paracentesis. A follow-up note from July 22 mention that the patient had a MELD sodium of only 6 at the time and that she had had a 3.6 L paracentesis done on July 19 with no fluid studies drawn and then had the July 20 At 1.2 L removed with the ANC being 235.  She had a Bacteroides uniformis bacteremia and ID had seen the patient.  She is also getting treatment for C. difficile with Dificid. June 8 2022 MRI showed cirrhotic morphology of the liver with ill-defined area of enhancement seen in segment 4A of the liver measuring 2.4 x 1.4 cm and additional smaller patchy areas or 2 enhancement at the periphery of segment 2 for a seen.  No prior MRI noted.  3 mm cyst seen in segment 4A.  Gallbladder was distended with stones but no definite cholecystitis, bile duct was 2.7 cm and patulous.  Spleen was grossly normal and pancreas with mild atrophy.  Mild atrophy of the pancreas seen with irregular dilation of pancreatic duct as well.  They mention that the patient had her in summary cirrhosis changes with stigmata of hypertension including moderate ascites, upper abdominal varices, and a few areas of arterial hyperenhancement likely arterial shunts and recommended 3-month MRI to reassess.  Significant edematous wall thickening of the stomach was seen recommend EGD and patulous common bile duct changes were seen measuring up to 2.7 cm and choledochal cyst would be difficult to exclude. Plan 1.  We will check quantitate immunoglobulins as I suspected the SAURAV and the AMA are falsely positive given the AST and ALT being normal. 2.  Patients have these low-level positive SAURAV and AMA and up to 40% of the cases related to fatty liver either from pancreatic insufficiency issues or related to the previous alcohol that she had exposure history noted. 3.  Patient needs updated MRI to get better look at liver given the questionable issues raised before as specifically areas were seen HERE hyperenhancement that they were possibly arterial portal shunt that needed to be correlated. 4.  Given the complicated course that she has had in the suspected history of underlying cirrhosis before Fridays, may be worthwhile to have the transplant team at [place] assess the patient and their hepatology clinic due to the hepatobiliary expertise there and consider options for this patient based upon above.   Duration of the visit was minutes with 10 minutes of chart prep and 20 minutes of face to face/TeleMed visit reviewing recent records, discussing their current status and the future plans for the patient.

## 2023-08-22 ENCOUNTER — OFFICE VISIT (OUTPATIENT)
Dept: URBAN - METROPOLITAN AREA SURGERY CENTER 31 | Facility: SURGERY CENTER | Age: 63
End: 2023-08-22

## 2023-09-16 LAB
ALBUMIN/GLOBULIN RATIO: 1.6
ALBUMIN: 4.2
ALKALINE PHOSPHATASE: 109
ALKALINE PHOSPHATASE: 113
ALT (SGPT): 27
AMMONIA (P): 34
AST (SGOT): 24
BILIRUBIN, DIRECT: 0.1
BILIRUBIN, INDIRECT: 0.4
BILIRUBIN, TOTAL: 0.5
BONE ISOENZYMES: 71
GLOBULIN: 2.6
INR: 1.1
INTESTINAL ISOENZYMES: 0
LIVER ISOENZYMES: 29
MACROHEPATIC ISOENZYMES: 0
PLACENTAL ISOENZYMES: 0
PROTEIN, TOTAL: 6.8
PT: 11.1

## 2023-09-19 ENCOUNTER — OFFICE VISIT (OUTPATIENT)
Dept: URBAN - METROPOLITAN AREA CLINIC 86 | Facility: CLINIC | Age: 63
End: 2023-09-19

## 2023-09-27 ENCOUNTER — OFFICE VISIT (OUTPATIENT)
Dept: URBAN - METROPOLITAN AREA CLINIC 128 | Facility: CLINIC | Age: 63
End: 2023-09-27

## 2023-10-10 ENCOUNTER — OFFICE VISIT (OUTPATIENT)
Dept: URBAN - METROPOLITAN AREA SURGERY CENTER 31 | Facility: SURGERY CENTER | Age: 63
End: 2023-10-10

## 2023-10-10 ENCOUNTER — TELEPHONE ENCOUNTER (OUTPATIENT)
Dept: URBAN - METROPOLITAN AREA CLINIC 128 | Facility: CLINIC | Age: 63
End: 2023-10-10

## 2023-10-10 RX ORDER — ONDANSETRON 4 MG/1
1 TABLET ON THE TONGUE AND ALLOW TO DISSOLVE TABLET, ORALLY DISINTEGRATING ORAL
Qty: 30 | Refills: 0 | OUTPATIENT
Start: 2023-10-10

## 2023-10-10 RX ORDER — PANCRELIPASE 36000; 180000; 114000 [USP'U]/1; [USP'U]/1; [USP'U]/1
TAKE 1 CAPSULE BEFORE EACH MEAL AND EACH SNACK CAPSULE, DELAYED RELEASE PELLETS ORAL
Qty: 150 | Refills: 2 | Status: ACTIVE | COMMUNITY

## 2023-10-10 RX ORDER — CHOLESTYRAMINE 4 G/9G
1 PACKET MIXED WITH WATER OR NON-CARBONATED DRINK POWDER, FOR SUSPENSION ORAL TWICE A DAY
Qty: 60 | Refills: 2 | Status: ACTIVE | COMMUNITY

## 2023-10-11 ENCOUNTER — OFFICE VISIT (OUTPATIENT)
Dept: URBAN - METROPOLITAN AREA SURGERY CENTER 31 | Facility: SURGERY CENTER | Age: 63
End: 2023-10-11

## 2023-11-01 ENCOUNTER — OFFICE VISIT (OUTPATIENT)
Dept: URBAN - METROPOLITAN AREA CLINIC 128 | Facility: CLINIC | Age: 63
End: 2023-11-01

## 2023-11-21 ENCOUNTER — OFFICE VISIT (OUTPATIENT)
Dept: URBAN - METROPOLITAN AREA SURGERY CENTER 31 | Facility: SURGERY CENTER | Age: 63
End: 2023-11-21

## 2023-11-30 ENCOUNTER — OFFICE VISIT (OUTPATIENT)
Dept: URBAN - METROPOLITAN AREA CLINIC 128 | Facility: CLINIC | Age: 63
End: 2023-11-30

## 2024-01-22 ENCOUNTER — OFFICE VISIT (OUTPATIENT)
Dept: URBAN - METROPOLITAN AREA SURGERY CENTER 31 | Facility: SURGERY CENTER | Age: 64
End: 2024-01-22

## 2024-02-05 ENCOUNTER — OV EP (OUTPATIENT)
Dept: URBAN - METROPOLITAN AREA CLINIC 128 | Facility: CLINIC | Age: 64
End: 2024-02-05

## 2024-02-13 ENCOUNTER — COLON (OUTPATIENT)
Dept: URBAN - METROPOLITAN AREA SURGERY CENTER 31 | Facility: SURGERY CENTER | Age: 64
End: 2024-02-13

## 2024-02-14 ENCOUNTER — OV EP (OUTPATIENT)
Dept: URBAN - METROPOLITAN AREA CLINIC 128 | Facility: CLINIC | Age: 64
End: 2024-02-14

## 2024-03-13 ENCOUNTER — OV EP (OUTPATIENT)
Dept: URBAN - METROPOLITAN AREA CLINIC 128 | Facility: CLINIC | Age: 64
End: 2024-03-13

## 2024-09-18 NOTE — PHYSICAL EXAM GASTROINTESTINAL
Abdomen , soft, nontender, nondistended , no guarding or rigidity , no masses palpable , normal bowel sounds, negative Monahan's sign, negative psoas and obturators signs, negative CVA tenderness bilaterally Liver and Spleen , no hepatosplenomegaly Rectal deferred What Type Of Note Output Would You Prefer (Optional)?: Standard Output Is This A New Presentation, Or A Follow-Up?: Skin Lesion

## 2024-10-03 ENCOUNTER — OFFICE VISIT (OUTPATIENT)
Dept: URBAN - METROPOLITAN AREA CLINIC 128 | Facility: CLINIC | Age: 64
End: 2024-10-03
Payer: COMMERCIAL

## 2024-10-03 ENCOUNTER — LAB OUTSIDE AN ENCOUNTER (OUTPATIENT)
Dept: URBAN - METROPOLITAN AREA CLINIC 128 | Facility: CLINIC | Age: 64
End: 2024-10-03

## 2024-10-03 VITALS
WEIGHT: 112 LBS | SYSTOLIC BLOOD PRESSURE: 108 MMHG | HEART RATE: 76 BPM | TEMPERATURE: 97.5 F | HEIGHT: 66 IN | DIASTOLIC BLOOD PRESSURE: 68 MMHG | BODY MASS INDEX: 18 KG/M2

## 2024-10-03 DIAGNOSIS — Z86.0101 H/O ADENOMATOUS POLYP OF COLON: ICD-10-CM

## 2024-10-03 DIAGNOSIS — R76.8 POSITIVE ANA (ANTINUCLEAR ANTIBODY): ICD-10-CM

## 2024-10-03 DIAGNOSIS — R79.89 ELEVATED LIVER FUNCTION TESTS: ICD-10-CM

## 2024-10-03 DIAGNOSIS — K86.81 EXOCRINE PANCREATIC INSUFFICIENCY: ICD-10-CM

## 2024-10-03 DIAGNOSIS — R19.7 DIARRHEA: ICD-10-CM

## 2024-10-03 DIAGNOSIS — K80.20 CALCULUS OF GALLBLADDER WITHOUT CHOLECYSTITIS WITHOUT OBSTRUCTION: ICD-10-CM

## 2024-10-03 DIAGNOSIS — K76.0 FATTY LIVER: ICD-10-CM

## 2024-10-03 DIAGNOSIS — K74.60 CIRRHOSIS OF LIVER WITHOUT ASCITES, UNSPECIFIED HEPATIC CIRRHOSIS TYPE: ICD-10-CM

## 2024-10-03 DIAGNOSIS — R93.89 ABNORMAL CT SCAN: ICD-10-CM

## 2024-10-03 PROBLEM — 129679001: Status: ACTIVE | Noted: 2024-10-03

## 2024-10-03 PROCEDURE — 99214 OFFICE O/P EST MOD 30 MIN: CPT | Performed by: PHYSICIAN ASSISTANT

## 2024-10-03 RX ORDER — CHOLESTYRAMINE 4 G/9G
1 PACKET MIXED WITH WATER OR NON-CARBONATED DRINK POWDER, FOR SUSPENSION ORAL TWICE A DAY
Qty: 60 | Refills: 2 | OUTPATIENT

## 2024-10-03 RX ORDER — PANCRELIPASE 36000; 180000; 114000 [USP'U]/1; [USP'U]/1; [USP'U]/1
TAKE 1 CAPSULE BEFORE EACH MEAL AND EACH SNACK CAPSULE, DELAYED RELEASE PELLETS ORAL
Qty: 150 | Refills: 0 | Status: ACTIVE | COMMUNITY

## 2024-10-03 RX ORDER — CHOLESTYRAMINE 4 G/9G
1 PACKET MIXED WITH WATER OR NON-CARBONATED DRINK POWDER, FOR SUSPENSION ORAL TWICE A DAY
Qty: 60 | Refills: 2 | Status: ACTIVE | COMMUNITY

## 2024-10-03 RX ORDER — ONDANSETRON 4 MG/1
1 TABLET ON THE TONGUE AND ALLOW TO DISSOLVE TABLET, ORALLY DISINTEGRATING ORAL
Qty: 30 | Refills: 0 | Status: ACTIVE | COMMUNITY
Start: 2023-10-10

## 2024-10-03 RX ORDER — POLYETHYLENE GLYCOL 3350, SODIUM SULFATE ANHYDROUS, SODIUM BICARBONATE, SODIUM CHLORIDE, POTASSIUM CHLORIDE 236; 22.74; 6.74; 5.86; 2.97 G/4L; G/4L; G/4L; G/4L; G/4L
AS DIRECTED POWDER, FOR SOLUTION ORAL ONCE A DAY
Qty: 1 BOTTLE | Refills: 0 | OUTPATIENT
Start: 2024-10-03 | End: 2024-10-04

## 2024-10-03 RX ORDER — PANCRELIPASE 36000; 180000; 114000 [USP'U]/1; [USP'U]/1; [USP'U]/1
TAKE 1 CAPSULE BEFORE EACH MEAL AND EACH SNACK CAPSULE, DELAYED RELEASE PELLETS ORAL
Qty: 150 | Refills: 2 | OUTPATIENT

## 2024-10-03 NOTE — HPI-TODAY'S VISIT:
The patient is here for a follow up visit on her diarrhea which has persisted. She is only taking her creon 1 pill a day due to cost Duration of symptoms: years It is relieved by: creon It is aggravated by: nothing Associated symptoms:  none Recent antibiotics: none Recent travel outside of [place]: none Recent sick contacts: none Current stress: yes Recent medication changes: none Recent dietary changes: none Recent weight changes: none 06/22 EGD revealed candida esophagitis, ERCP with stent removal, sweep of sludge, and sphincterotomy was performed. She never went to the general surgeon to see if she needed a cholecystectomy She never followed up with rheumatologist due to her positive SAURAV but will do so soon. She has known rheumatoid arthritis Last colonoscopy date: 2016 revealed a tubular adenoma and she was advised to repeat it in 3 years She had a hip fracture last year after falling going to the bathroom at night while on gabapentin  2022 MRCP/MRI revealed  Distention of the gallbladder and marked, bile duct         dilation, at least mildly progressed compared to 2014 and 2016. There         is abrupt transition to decompressed distal common bile duct at the         level of the pancreatic head and again seen are changes of chronic         pancreatitis, as well as persistent pancreatic ductal dilation. Within         limitations imposed by respiratory motion and lack of intravenous         contrast, no discrete pancreatic head mass is identified. There is         layering low-signal intensity within the common bile duct which could         represent sludge and/or small stones, versus artifact. ERCP could be         considered for further evaluation. The caliber change of the distal         common bile duct is favored to represent chronic stricturing in the         setting of multiple episodes of prior pancreatitis but is         indeterminate. Short-term follow-up may be considered, versus further         evaluation with postcontrast MRI of the pancreas to better evaluate the         region of the pancreatic head.          Cirrhotic morphology of the liver with a small amount of adjacent         ascites. Again, within limitations imposed by lack of intravenous         contrast, no definite focal hepatic lesions are seen on this exam.         Kidneys where seen are also unremarkable.          Suspected significant central canal stenosis at L2-L3, incompletely         evaluated but likely related to significant disc/osteophyte complex at         that level.  RUQ US 2022  revealed remarkably abnormal gallbladder with gallbladder wall thickening and         findings suspicious for intramural edema. Gallbladder sludge and         cholelithiasis is noted. The common bile duct is dilated measured at 8         mm and 16 mm. Sonographic Monahan's sign was reportedly not present.         Common bile duct obstruction is not excluded. Common bile duct stent         reported on the prior ultrasound and noted on recent prior CT, not         demonstrated on the current study ultrasound. Clinical correlation is         needed.          Mild hepatic steatosis. Portal vein patent with hepatopedal flow.          The pancreas was poorly visualized.          There is a small amount of ascites present.  Her SAURAV was positive, her AMA was elevated. Her alk phos revealed 320. Her sodium was 129 so she was advised to follow up with pcp about this. She has not done the repeat MRI/MRCP or the colonoscopy yet due to a fall she had recently but will reschedule these today.  8/2/24 Abdomen and pelvic CT scan revealed bladder wall thickening which could potentially represent cystitis, verna within the bladder, chornic pancreatitis, chronic dilatation of the common duct without appeciable change, this may represent a choledochal cyst, coronary arterial calficiations, fatty liver, gallbladder distention with a nodule withinin the inferior gallbladder, recommend RUQ US for further evaluation. She will see a rurologst about her cystitis.  Patient denies a family history of colon polyps or colon cancer or stomach cancer  Patient denies any lung or kidney problems.  She is on a loop monitor for her heart and is on eliquis. She is under the care of cardiologist Patient denies any blood thinners or oxygen therapy. Denies any dialysis. Denies any pacemaker or defibrillator.  She has a heart monitor currently She is a known diabetic. She thinks she has had a blood clot in her toe and it turned black

## 2024-10-07 ENCOUNTER — TELEPHONE ENCOUNTER (OUTPATIENT)
Dept: URBAN - METROPOLITAN AREA CLINIC 128 | Facility: CLINIC | Age: 64
End: 2024-10-07

## 2024-10-25 ENCOUNTER — LAB OUTSIDE AN ENCOUNTER (OUTPATIENT)
Dept: URBAN - METROPOLITAN AREA CLINIC 128 | Facility: CLINIC | Age: 64
End: 2024-10-25

## 2024-12-05 ENCOUNTER — TELEPHONE ENCOUNTER (OUTPATIENT)
Dept: URBAN - METROPOLITAN AREA CLINIC 128 | Facility: CLINIC | Age: 64
End: 2024-12-05

## 2025-02-12 ENCOUNTER — OFFICE VISIT (OUTPATIENT)
Dept: URBAN - METROPOLITAN AREA SURGERY CENTER 31 | Facility: SURGERY CENTER | Age: 65
End: 2025-02-12

## 2025-03-10 ENCOUNTER — OFFICE VISIT (OUTPATIENT)
Dept: URBAN - METROPOLITAN AREA CLINIC 128 | Facility: CLINIC | Age: 65
End: 2025-03-10

## 2025-03-18 ENCOUNTER — P2P PATIENT RECORD (OUTPATIENT)
Age: 65
End: 2025-03-18

## 2025-06-03 ENCOUNTER — TELEPHONE ENCOUNTER (OUTPATIENT)
Dept: URBAN - METROPOLITAN AREA CLINIC 19 | Facility: CLINIC | Age: 65
End: 2025-06-03